# Patient Record
Sex: FEMALE | URBAN - METROPOLITAN AREA
[De-identification: names, ages, dates, MRNs, and addresses within clinical notes are randomized per-mention and may not be internally consistent; named-entity substitution may affect disease eponyms.]

---
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
## 2020-12-21 PROBLEM — N76.0 BACTERIAL VAGINOSIS: Status: RESOLVED | Noted: 2019-05-01 | Resolved: 2020-12-21

## 2022-10-05 ENCOUNTER — OUTPATIENT (OUTPATIENT)
Dept: OUTPATIENT SERVICES | Age: 22
LOS: 1 days | Discharge: HOME | End: 2022-10-05

## 2022-10-07 ENCOUNTER — APPOINTMENT (OUTPATIENT)
Dept: PEDIATRIC CARDIOLOGY | Facility: CLINIC | Age: 22
End: 2022-10-07

## 2022-11-29 ENCOUNTER — OUTPATIENT (OUTPATIENT)
Dept: OUTPATIENT SERVICES | Age: 22
LOS: 1 days | Discharge: HOME | End: 2022-11-29

## 2022-11-29 PROBLEM — Z00.00 ENCOUNTER FOR PREVENTIVE HEALTH EXAMINATION: Status: ACTIVE | Noted: 2022-11-29

## 2022-11-30 ENCOUNTER — OUTPATIENT (OUTPATIENT)
Dept: OUTPATIENT SERVICES | Age: 22
LOS: 1 days | Discharge: HOME | End: 2022-11-30

## 2022-11-30 ENCOUNTER — APPOINTMENT (OUTPATIENT)
Dept: PEDIATRIC CARDIOLOGY | Facility: CLINIC | Age: 22
End: 2022-11-30

## 2022-11-30 PROCEDURE — 99202 OFFICE O/P NEW SF 15 MIN: CPT

## 2022-11-30 PROCEDURE — 76825 ECHO EXAM OF FETAL HEART: CPT

## 2022-11-30 PROCEDURE — 93325 DOPPLER ECHO COLOR FLOW MAPG: CPT | Mod: 59

## 2022-11-30 PROCEDURE — 76827 ECHO EXAM OF FETAL HEART: CPT

## 2022-11-30 PROCEDURE — 76820 UMBILICAL ARTERY ECHO: CPT

## 2022-11-30 PROCEDURE — 76821 MIDDLE CEREBRAL ARTERY ECHO: CPT

## 2023-12-31 PROBLEM — Z11.3 ENCOUNTER FOR SCREENING EXAMINATION FOR SEXUALLY TRANSMITTED DISEASE: Status: RESOLVED | Noted: 2018-10-03 | Resolved: 2020-12-21
